# Patient Record
Sex: FEMALE | Race: WHITE | NOT HISPANIC OR LATINO | Employment: OTHER | URBAN - METROPOLITAN AREA
[De-identification: names, ages, dates, MRNs, and addresses within clinical notes are randomized per-mention and may not be internally consistent; named-entity substitution may affect disease eponyms.]

---

## 2018-01-17 ENCOUNTER — NEW PATIENT COMPREHENSIVE (OUTPATIENT)
Dept: URBAN - METROPOLITAN AREA CLINIC 47 | Facility: CLINIC | Age: 78
End: 2018-01-17

## 2018-01-17 DIAGNOSIS — H52.03: ICD-10-CM

## 2018-01-17 PROCEDURE — 92004 COMPRE OPH EXAM NEW PT 1/>: CPT

## 2018-01-17 PROCEDURE — 92015 DETERMINE REFRACTIVE STATE: CPT

## 2018-01-17 ASSESSMENT — VISUAL ACUITY
OS_CC: J1
OU_CC: 20/20
OU_SC: 20/100-1
OU_SC: J12
OD_CC: J2
OS_SC: 20/400
OD_CC: 20/30-1
OD_SC: 20/400
OU_CC: J1
OS_CC: 20/25

## 2018-01-17 ASSESSMENT — TONOMETRY
OS_IOP_MMHG: 18
OD_IOP_MMHG: 16

## 2018-08-16 ENCOUNTER — EST. PATIENT EMERGENCY (OUTPATIENT)
Dept: URBAN - METROPOLITAN AREA CLINIC 47 | Facility: CLINIC | Age: 78
End: 2018-08-16

## 2018-08-16 DIAGNOSIS — H43.813: ICD-10-CM

## 2018-08-16 PROCEDURE — 1036F TOBACCO NON-USER: CPT

## 2018-08-16 PROCEDURE — G8428 CUR MEDS NOT DOCUMENT: HCPCS

## 2018-08-16 PROCEDURE — 92012 INTRM OPH EXAM EST PATIENT: CPT

## 2018-08-16 PROCEDURE — G9903 PT SCRN TBCO ID AS NON USER: HCPCS

## 2018-08-16 PROCEDURE — 92134 CPTRZ OPH DX IMG PST SGM RTA: CPT

## 2018-08-16 ASSESSMENT — TONOMETRY
OD_IOP_MMHG: 15
OS_IOP_MMHG: 16

## 2018-08-16 ASSESSMENT — VISUAL ACUITY
OD_CC: 20/30-2
OS_CC: J1
OS_CC: 20/25-2
OD_CC: J3

## 2019-01-23 ENCOUNTER — ESTABLISHED COMPREHENSIVE EXAM (OUTPATIENT)
Dept: URBAN - METROPOLITAN AREA CLINIC 47 | Facility: CLINIC | Age: 79
End: 2019-01-23

## 2019-01-23 DIAGNOSIS — H52.03: ICD-10-CM

## 2019-01-23 DIAGNOSIS — H52.4: ICD-10-CM

## 2019-01-23 PROCEDURE — 92014 COMPRE OPH EXAM EST PT 1/>: CPT

## 2019-01-23 PROCEDURE — 92015 DETERMINE REFRACTIVE STATE: CPT

## 2019-01-23 ASSESSMENT — VISUAL ACUITY
OD_CC: 20/40
OD_SC: CF 5FT
OS_CC: 20/30+3
OS_PH: 20/40-2
OS_SC: CF 5FT
OD_PH: 20/50+2
OD_CC: J1
OS_CC: J1
OS_SC: J12

## 2019-01-23 ASSESSMENT — TONOMETRY
OD_IOP_MMHG: 16
OS_IOP_MMHG: 15

## 2020-03-04 ENCOUNTER — ESTABLISHED COMPREHENSIVE EXAM (OUTPATIENT)
Dept: URBAN - METROPOLITAN AREA CLINIC 47 | Facility: CLINIC | Age: 80
End: 2020-03-04

## 2020-03-04 DIAGNOSIS — H52.7: ICD-10-CM

## 2020-03-04 DIAGNOSIS — H25.811: ICD-10-CM

## 2020-03-04 DIAGNOSIS — H25.812: ICD-10-CM

## 2020-03-04 DIAGNOSIS — H52.4: ICD-10-CM

## 2020-03-04 PROCEDURE — 92015 DETERMINE REFRACTIVE STATE: CPT

## 2020-03-04 PROCEDURE — 92014 COMPRE OPH EXAM EST PT 1/>: CPT

## 2020-03-04 ASSESSMENT — TONOMETRY
OD_IOP_MMHG: 16
OS_IOP_MMHG: 16

## 2020-03-04 ASSESSMENT — VISUAL ACUITY
OS_SC: 20/400
OD_SC: 20/400
OD_CC: 20/30-2
OD_BAT: 20/100
OS_BAT: 20/80
OS_CC: 20/25-1
OS_SC: >J12
OS_CC: J1
OD_SC: >J12
OD_CC: J1

## 2020-05-12 ENCOUNTER — CATARACT CONSULT (OUTPATIENT)
Dept: URBAN - METROPOLITAN AREA CLINIC 43 | Facility: CLINIC | Age: 80
End: 2020-05-12

## 2020-05-12 DIAGNOSIS — H43.813: ICD-10-CM

## 2020-05-12 DIAGNOSIS — H16.223: ICD-10-CM

## 2020-05-12 DIAGNOSIS — H04.123: ICD-10-CM

## 2020-05-12 DIAGNOSIS — H35.371: ICD-10-CM

## 2020-05-12 DIAGNOSIS — H18.51: ICD-10-CM

## 2020-05-12 DIAGNOSIS — H25.812: ICD-10-CM

## 2020-05-12 DIAGNOSIS — H25.811: ICD-10-CM

## 2020-05-12 PROCEDURE — 92136TC INTERFEROMETRY - TECHNICAL COMPONENT

## 2020-05-12 PROCEDURE — 92134 CPTRZ OPH DX IMG PST SGM RTA: CPT

## 2020-05-12 PROCEDURE — 92286 ANT SGM IMG I&R SPECLR MIC: CPT

## 2020-05-12 PROCEDURE — 92025-1 CORNEAL TOPOGRAPHY, INS

## 2020-05-12 PROCEDURE — 99214 OFFICE O/P EST MOD 30 MIN: CPT

## 2020-05-12 ASSESSMENT — TONOMETRY
OD_IOP_MMHG: 15
OS_IOP_MMHG: 14

## 2020-05-12 ASSESSMENT — VISUAL ACUITY
OD_SC: <J12
OS_AM: 20/20
OD_CC: 20/40+1
OS_CC: J1
OS_CC: 20/25-2
OS_SC: 20/400
OD_BAT: 20/100
OS_SC: <J12
OD_CC: J2
OD_RAM: 20/20
OD_SC: 20/200-1
OS_BAT: 20/80

## 2020-06-01 ENCOUNTER — SURGERY/PROCEDURE (OUTPATIENT)
Dept: URBAN - METROPOLITAN AREA CLINIC 43 | Facility: CLINIC | Age: 80
End: 2020-06-01

## 2020-06-01 ENCOUNTER — PRE-OP/H&P (OUTPATIENT)
Dept: URBAN - METROPOLITAN AREA CLINIC 39 | Facility: CLINIC | Age: 80
End: 2020-06-01

## 2020-06-01 DIAGNOSIS — H25.812: ICD-10-CM

## 2020-06-01 DIAGNOSIS — H16.223: ICD-10-CM

## 2020-06-01 PROCEDURE — 99211T TECH SERVICE

## 2020-06-01 PROCEDURE — 66984 XCAPSL CTRC RMVL W/O ECP: CPT

## 2020-06-02 ENCOUNTER — POST OP/EVAL OF SECOND EYE (OUTPATIENT)
Dept: URBAN - METROPOLITAN AREA CLINIC 47 | Facility: CLINIC | Age: 80
End: 2020-06-02

## 2020-06-02 DIAGNOSIS — Z96.1: ICD-10-CM

## 2020-06-02 DIAGNOSIS — H25.811: ICD-10-CM

## 2020-06-02 PROCEDURE — 99024 POSTOP FOLLOW-UP VISIT: CPT

## 2020-06-02 PROCEDURE — 99212 OFFICE O/P EST SF 10 MIN: CPT

## 2020-06-02 ASSESSMENT — VISUAL ACUITY
OD_RAM: 20/20
OD_CC: 20/40+1
OD_BAT: 20/100
OD_CC: J2
OS_SC: J12
OS_SC: 20/40
OD_SC: 20/200+1
OD_SC: >J12

## 2020-06-02 ASSESSMENT — TONOMETRY
OS_IOP_MMHG: 17
OD_IOP_MMHG: 17

## 2020-06-08 ENCOUNTER — SURGERY/PROCEDURE (OUTPATIENT)
Dept: URBAN - METROPOLITAN AREA CLINIC 43 | Facility: CLINIC | Age: 80
End: 2020-06-08

## 2020-06-08 ENCOUNTER — PRE-OP/H&P (OUTPATIENT)
Dept: URBAN - METROPOLITAN AREA CLINIC 39 | Facility: CLINIC | Age: 80
End: 2020-06-08

## 2020-06-08 DIAGNOSIS — Z96.1: ICD-10-CM

## 2020-06-08 DIAGNOSIS — H25.811: ICD-10-CM

## 2020-06-08 PROCEDURE — 99211T TECH SERVICE

## 2020-06-08 PROCEDURE — 66984 XCAPSL CTRC RMVL W/O ECP: CPT

## 2020-06-08 ASSESSMENT — VISUAL ACUITY
OD_SC: J<12
OD_SC: 20/200
OS_SC: 20/30-1

## 2020-06-08 ASSESSMENT — TONOMETRY
OS_IOP_MMHG: 16
OD_IOP_MMHG: 16

## 2020-06-09 ENCOUNTER — CATARACT POST-OP 1-DAY (OUTPATIENT)
Dept: URBAN - METROPOLITAN AREA CLINIC 47 | Facility: CLINIC | Age: 80
End: 2020-06-09

## 2020-06-09 DIAGNOSIS — Z96.1: ICD-10-CM

## 2020-06-09 PROCEDURE — 99024 POSTOP FOLLOW-UP VISIT: CPT

## 2020-06-09 ASSESSMENT — TONOMETRY
OS_IOP_MMHG: 16
OD_IOP_MMHG: 17

## 2020-06-09 ASSESSMENT — VISUAL ACUITY
OD_SC: 20/30-1
OS_SC: 20/25
OU_SC: 20/25

## 2020-06-16 ENCOUNTER — POST-OP CATARACT (OUTPATIENT)
Dept: URBAN - METROPOLITAN AREA CLINIC 47 | Facility: CLINIC | Age: 80
End: 2020-06-16

## 2020-06-16 DIAGNOSIS — Z96.1: ICD-10-CM

## 2020-06-16 PROCEDURE — 99024 POSTOP FOLLOW-UP VISIT: CPT

## 2020-06-16 ASSESSMENT — VISUAL ACUITY
OD_SC: 20/40-1
OS_SC: J4
OS_SC: 20/30
OD_SC: J8

## 2020-06-16 ASSESSMENT — TONOMETRY
OS_IOP_MMHG: 17
OD_IOP_MMHG: 16

## 2020-07-07 ENCOUNTER — POST-OP CATARACT (OUTPATIENT)
Dept: URBAN - METROPOLITAN AREA CLINIC 47 | Facility: CLINIC | Age: 80
End: 2020-07-07

## 2020-07-07 DIAGNOSIS — Z96.1: ICD-10-CM

## 2020-07-07 DIAGNOSIS — H26.493: ICD-10-CM

## 2020-07-07 PROCEDURE — 99024 POSTOP FOLLOW-UP VISIT: CPT

## 2020-07-07 ASSESSMENT — VISUAL ACUITY
OD_CC: J1
OD_SC: 20/40
OS_SC: <J12
OD_SC: <J12
OS_CC: J1
OS_SC: 20/30+2

## 2020-07-07 ASSESSMENT — TONOMETRY
OS_IOP_MMHG: 15
OD_IOP_MMHG: 16

## 2021-01-06 ENCOUNTER — ESTABLISHED COMPREHENSIVE EXAM (OUTPATIENT)
Dept: URBAN - METROPOLITAN AREA CLINIC 47 | Facility: CLINIC | Age: 81
End: 2021-01-06

## 2021-01-06 DIAGNOSIS — H26.493: ICD-10-CM

## 2021-01-06 DIAGNOSIS — H04.123: ICD-10-CM

## 2021-01-06 PROCEDURE — 92012 INTRM OPH EXAM EST PATIENT: CPT

## 2021-01-06 ASSESSMENT — VISUAL ACUITY
OD_SC: 20/60-2
OS_SC: J6-
OD_SC: J8
OS_SC: 20/30-2
OS_CC: J1
OD_CC: 20/40+2
OD_CC: J1
OS_CC: 20/25-1

## 2021-01-06 ASSESSMENT — TONOMETRY
OD_IOP_MMHG: 16
OS_IOP_MMHG: 16

## 2022-05-05 ENCOUNTER — ESTABLISHED PATIENT (OUTPATIENT)
Dept: URBAN - METROPOLITAN AREA CLINIC 47 | Facility: CLINIC | Age: 82
End: 2022-05-05

## 2022-05-05 DIAGNOSIS — H26.493: ICD-10-CM

## 2022-05-05 DIAGNOSIS — H04.123: ICD-10-CM

## 2022-05-05 DIAGNOSIS — H52.4: ICD-10-CM

## 2022-05-05 DIAGNOSIS — H52.7: ICD-10-CM

## 2022-05-05 PROCEDURE — 92015 DETERMINE REFRACTIVE STATE: CPT

## 2022-05-05 PROCEDURE — 92014 COMPRE OPH EXAM EST PT 1/>: CPT

## 2022-05-05 ASSESSMENT — VISUAL ACUITY
OS_SC: 20/40
OS_CC: J1-
OD_CC: J1
OS_CC: 20/25
OD_CC: 20/25
OD_SC: 20/40-
OS_SC: J10
OD_SC: J10

## 2022-05-05 ASSESSMENT — TONOMETRY
OS_IOP_MMHG: 16
OD_IOP_MMHG: 16

## 2023-05-10 ENCOUNTER — COMPREHENSIVE EXAM (OUTPATIENT)
Dept: URBAN - METROPOLITAN AREA CLINIC 47 | Facility: CLINIC | Age: 83
End: 2023-05-10

## 2023-05-10 DIAGNOSIS — H52.7: ICD-10-CM

## 2023-05-10 DIAGNOSIS — H26.493: ICD-10-CM

## 2023-05-10 DIAGNOSIS — H01.021: ICD-10-CM

## 2023-05-10 DIAGNOSIS — H52.4: ICD-10-CM

## 2023-05-10 DIAGNOSIS — G43.109: ICD-10-CM

## 2023-05-10 DIAGNOSIS — H01.024: ICD-10-CM

## 2023-05-10 DIAGNOSIS — H04.123: ICD-10-CM

## 2023-05-10 PROCEDURE — 92014 COMPRE OPH EXAM EST PT 1/>: CPT

## 2023-05-10 PROCEDURE — 92015 DETERMINE REFRACTIVE STATE: CPT

## 2023-05-10 ASSESSMENT — VISUAL ACUITY
OS_CC: 20/20-2
OD_CC: 20/30-2
OS_BAT: 20/40
OS_CC: J2
OD_BAT: 20/60
OD_CC: J1

## 2023-05-10 ASSESSMENT — TONOMETRY
OS_IOP_MMHG: 16
OD_IOP_MMHG: 16

## 2024-03-06 ENCOUNTER — COMPREHENSIVE EXAM (OUTPATIENT)
Dept: URBAN - METROPOLITAN AREA CLINIC 47 | Facility: CLINIC | Age: 84
End: 2024-03-06

## 2024-03-06 DIAGNOSIS — M31.6: ICD-10-CM

## 2024-03-06 DIAGNOSIS — H01.024: ICD-10-CM

## 2024-03-06 DIAGNOSIS — H53.2: ICD-10-CM

## 2024-03-06 DIAGNOSIS — H26.493: ICD-10-CM

## 2024-03-06 DIAGNOSIS — H04.123: ICD-10-CM

## 2024-03-06 DIAGNOSIS — H01.021: ICD-10-CM

## 2024-03-06 PROCEDURE — 99215 OFFICE O/P EST HI 40 MIN: CPT

## 2024-03-06 ASSESSMENT — VISUAL ACUITY
OU_CC: J2
OD_CC: 20/50+1
OD_CC: J3
OS_CC: J2
OS_CC: 20/30+1

## 2024-03-06 ASSESSMENT — TONOMETRY
OD_IOP_MMHG: 11
OS_IOP_MMHG: 14

## 2024-03-08 ENCOUNTER — ADDENDUM (OUTPATIENT)
Dept: URBAN - METROPOLITAN AREA CLINIC 47 | Facility: CLINIC | Age: 84
End: 2024-03-08

## 2024-03-20 ENCOUNTER — FOLLOW UP (OUTPATIENT)
Dept: URBAN - METROPOLITAN AREA CLINIC 47 | Facility: CLINIC | Age: 84
End: 2024-03-20

## 2024-03-20 DIAGNOSIS — H01.021: ICD-10-CM

## 2024-03-20 DIAGNOSIS — H04.123: ICD-10-CM

## 2024-03-20 DIAGNOSIS — H52.7: ICD-10-CM

## 2024-03-20 DIAGNOSIS — H01.024: ICD-10-CM

## 2024-03-20 DIAGNOSIS — M31.6: ICD-10-CM

## 2024-03-20 DIAGNOSIS — H53.2: ICD-10-CM

## 2024-03-20 DIAGNOSIS — H26.493: ICD-10-CM

## 2024-03-20 PROCEDURE — 99213 OFFICE O/P EST LOW 20 MIN: CPT

## 2024-03-20 ASSESSMENT — VISUAL ACUITY
OD_CC: 20/20-2
OS_CC: 20/25-2

## 2024-03-20 ASSESSMENT — TONOMETRY
OS_IOP_MMHG: 16
OD_IOP_MMHG: 13

## 2024-05-15 ENCOUNTER — FOLLOW UP (OUTPATIENT)
Dept: URBAN - METROPOLITAN AREA CLINIC 47 | Facility: CLINIC | Age: 84
End: 2024-05-15

## 2024-05-15 DIAGNOSIS — H53.10: ICD-10-CM

## 2024-05-15 DIAGNOSIS — M31.6: ICD-10-CM

## 2024-05-15 PROCEDURE — 99213 OFFICE O/P EST LOW 20 MIN: CPT

## 2024-05-15 ASSESSMENT — VISUAL ACUITY
OS_BAT: 20/40
OD_BAT: 20/50
OD_CC: J1
OD_CC: 20/25-2
OS_CC: J1
OS_CC: 20/20-2

## 2025-02-18 ENCOUNTER — COMPREHENSIVE EXAM (OUTPATIENT)
Age: 85
End: 2025-02-18

## 2025-02-18 DIAGNOSIS — H04.123: ICD-10-CM

## 2025-02-18 DIAGNOSIS — H53.10: ICD-10-CM

## 2025-02-18 DIAGNOSIS — M31.6: ICD-10-CM

## 2025-02-18 DIAGNOSIS — H01.021: ICD-10-CM

## 2025-02-18 DIAGNOSIS — H01.024: ICD-10-CM

## 2025-02-18 DIAGNOSIS — H26.493: ICD-10-CM

## 2025-02-18 PROCEDURE — 99214 OFFICE O/P EST MOD 30 MIN: CPT

## 2025-03-13 ENCOUNTER — APPOINTMENT (OUTPATIENT)
Dept: URBAN - METROPOLITAN AREA CLINIC 137 | Facility: CLINIC | Age: 85
Setting detail: DERMATOLOGY
End: 2025-03-13

## 2025-03-13 DIAGNOSIS — L57.0 ACTINIC KERATOSIS: ICD-10-CM | Status: INADEQUATELY CONTROLLED

## 2025-03-13 PROCEDURE — ? COUNSELING

## 2025-03-13 PROCEDURE — ? LIQUID NITROGEN

## 2025-03-13 PROCEDURE — 17000 DESTRUCT PREMALG LESION: CPT

## 2025-03-13 ASSESSMENT — LOCATION DETAILED DESCRIPTION DERM: LOCATION DETAILED: LEFT INFERIOR MEDIAL MALAR CHEEK

## 2025-03-13 ASSESSMENT — LOCATION SIMPLE DESCRIPTION DERM: LOCATION SIMPLE: LEFT CHEEK

## 2025-03-13 ASSESSMENT — LOCATION ZONE DERM: LOCATION ZONE: FACE

## 2025-03-13 NOTE — PROCEDURE: LIQUID NITROGEN
Render Post-Care Instructions In Note?: no
Number Of Freeze-Thaw Cycles: 2 freeze-thaw cycles
Detail Level: Detailed
Post-Care Instructions: I reviewed with the patient in detail post-care instructions. Patient is to wear sunprotection, and avoid picking at any of the treated lesions. Pt may apply Vaseline to crusted or scabbing areas.
Duration Of Freeze Thaw-Cycle (Seconds): 1
Show Applicator Variable?: Yes
Application Tool (Optional): Liquid Nitrogen Sprayer
Consent: The patient's consent was obtained including but not limited to risks of crusting, scabbing, blistering, scarring, darker or lighter pigmentary change, recurrence, incomplete removal and infection.

## 2025-03-13 NOTE — HPI: EVALUATION OF SKIN LESION(S)
Hpi Title: Evaluation of a Skin Lesion Manual Repair Warning Statement: We plan on removing the manually selected variable below in favor of our much easier automatic structured text blocks found in the previous tab. We decided to do this to help make the flow better and give you the full power of structured data. Manual selection is never going to be ideal in our platform and I would encourage you to avoid using manual selection from this point on, especially since I will be sunsetting this feature. It is important that you do one of two things with the customized text below. First, you can save all of the text in a word file so you can have it for future reference. Second, transfer the text to the appropriate area in the Library tab. Lastly, if there is a flap or graft type which we do not have you need to let us know right away so I can add it in before the variable is hidden. No need to panic, we plan to give you roughly 6 months to make the change.

## 2025-05-06 ENCOUNTER — SURGERY/PROCEDURE (OUTPATIENT)
Age: 85
End: 2025-05-06

## 2025-05-06 ENCOUNTER — CONSULTATION/EVALUATION (OUTPATIENT)
Age: 85
End: 2025-05-06

## 2025-05-06 DIAGNOSIS — H52.7: ICD-10-CM

## 2025-05-06 DIAGNOSIS — H26.493: ICD-10-CM

## 2025-05-06 DIAGNOSIS — H35.363: ICD-10-CM

## 2025-05-06 PROCEDURE — 92015 DETERMINE REFRACTIVE STATE: CPT

## 2025-05-06 PROCEDURE — 99214 OFFICE O/P EST MOD 30 MIN: CPT | Mod: 57

## 2025-05-06 PROCEDURE — 6682150 YAG CAPSULOTOMY: Mod: 50

## 2025-05-06 PROCEDURE — 92134 CPTRZ OPH DX IMG PST SGM RTA: CPT

## 2025-05-12 ENCOUNTER — FOLLOW UP (OUTPATIENT)
Age: 85
End: 2025-05-12

## 2025-05-12 DIAGNOSIS — H52.7: ICD-10-CM

## 2025-05-12 DIAGNOSIS — Z98.890: ICD-10-CM

## 2025-05-12 PROCEDURE — 99024 POSTOP FOLLOW-UP VISIT: CPT
